# Patient Record
Sex: MALE | Race: ASIAN | NOT HISPANIC OR LATINO | Employment: OTHER | URBAN - METROPOLITAN AREA
[De-identification: names, ages, dates, MRNs, and addresses within clinical notes are randomized per-mention and may not be internally consistent; named-entity substitution may affect disease eponyms.]

---

## 2017-07-03 ENCOUNTER — HOSPITAL ENCOUNTER (OUTPATIENT)
Dept: LAB | Facility: HOSPITAL | Age: 67
Setting detail: SPECIMEN
Discharge: HOME | End: 2017-07-03
Attending: INTERNAL MEDICINE | Admitting: INTERNAL MEDICINE

## 2017-07-03 LAB
ALBUMIN SERPL-MCNC: 4.4 G/DL (ref 3.5–4.8)
ALBUMIN/GLOB SERPL: 1.4 {RATIO} (ref 1–1.7)
ALP SERPL-CCNC: 43 IU/L (ref 32–91)
ALT SERPL-CCNC: 15 IU/L (ref 17–63)
ANION GAP SERPL CALC-SCNC: 15.6 MMOL/L (ref 10–20)
AST SERPL-CCNC: 18 IU/L (ref 15–41)
BILIRUB SERPL-MCNC: 0.5 MG/DL (ref 0.3–1.2)
BUN SERPL-MCNC: 13 MG/DL (ref 8–20)
BUN/CREAT SERPL: 10 (ref 6.2–20.3)
CALCIUM SERPL-MCNC: 9.6 MG/DL (ref 8.9–10.3)
CHLORIDE SERPL-SCNC: 105 MMOL/L (ref 101–111)
CHOLEST SERPL-MCNC: 137 MG/DL
CHOLEST/HDLC SERPL: 3.1 {RATIO}
CONV CO2: 24 MMOL/L (ref 22–32)
CONV LDL CHOLESTEROL DIRECT: 79 MG/DL (ref 0–100)
CONV MICROALBUM.,U,RANDOM: 18 MG/L
CONV TOTAL PROTEIN: 7.5 G/DL (ref 6.1–7.9)
CREAT 24H UR-MCNC: 198.7 MG/DL
CREAT UR-MCNC: 1.3 MG/DL (ref 0.7–1.2)
GLOBULIN UR ELPH-MCNC: 3.1 G/DL (ref 2.5–3.8)
GLUCOSE SERPL-MCNC: 114 MG/DL (ref 65–99)
HDLC SERPL-MCNC: 44 MG/DL
LDLC/HDLC SERPL: 1.8 {RATIO}
LIPID INTERPRETATION: NORMAL
MICROALBUMIN/CREAT UR: 9.1 UG/MG
POTASSIUM SERPL-SCNC: 4.6 MMOL/L (ref 3.6–5.1)
SODIUM SERPL-SCNC: 140 MMOL/L (ref 136–144)
TRIGL SERPL-MCNC: 107 MG/DL
VLDLC SERPL CALC-MCNC: 14.4 MG/DL

## 2017-10-16 ENCOUNTER — HOSPITAL ENCOUNTER (OUTPATIENT)
Dept: LAB | Facility: HOSPITAL | Age: 67
Setting detail: SPECIMEN
Discharge: HOME | End: 2017-10-16
Attending: NURSE PRACTITIONER | Admitting: NURSE PRACTITIONER

## 2017-10-16 LAB
ALBUMIN SERPL-MCNC: 4.4 G/DL (ref 3.5–4.8)
ALBUMIN/GLOB SERPL: 1.6 {RATIO} (ref 1–1.7)
ALP SERPL-CCNC: 44 IU/L (ref 32–91)
ALT SERPL-CCNC: 15 IU/L (ref 17–63)
ANION GAP SERPL CALC-SCNC: 11.3 MMOL/L (ref 10–20)
AST SERPL-CCNC: 17 IU/L (ref 15–41)
BILIRUB SERPL-MCNC: 0.6 MG/DL (ref 0.3–1.2)
BUN SERPL-MCNC: 15 MG/DL (ref 8–20)
BUN/CREAT SERPL: 11.5 (ref 6.2–20.3)
CALCIUM SERPL-MCNC: 9.5 MG/DL (ref 8.9–10.3)
CHLORIDE SERPL-SCNC: 103 MMOL/L (ref 101–111)
CHOLEST SERPL-MCNC: 138 MG/DL
CHOLEST/HDLC SERPL: 3.4 {RATIO}
CONV CO2: 26 MMOL/L (ref 22–32)
CONV LDL CHOLESTEROL DIRECT: 77 MG/DL (ref 0–100)
CONV TOTAL PROTEIN: 7.2 G/DL (ref 6.1–7.9)
CREAT UR-MCNC: 1.3 MG/DL (ref 0.7–1.2)
GLOBULIN UR ELPH-MCNC: 2.8 G/DL (ref 2.5–3.8)
GLUCOSE SERPL-MCNC: 130 MG/DL (ref 65–99)
HDLC SERPL-MCNC: 41 MG/DL
LDLC/HDLC SERPL: 1.9 {RATIO}
LIPID INTERPRETATION: NORMAL
POTASSIUM SERPL-SCNC: 5.3 MMOL/L (ref 3.6–5.1)
SODIUM SERPL-SCNC: 135 MMOL/L (ref 136–144)
TRIGL SERPL-MCNC: 113 MG/DL
VLDLC SERPL CALC-MCNC: 20.2 MG/DL

## 2019-10-07 NOTE — PROGRESS NOTES
[facility] CARDIOLOGY      REASON FOR FOLLOW-UP:  Coronary artery disease  Status post PCI with stenting  Preserved LV function [place] dual antiplatelet therapy            Chief Complaint   Patient presents with   • Diabetes     Followup - pain in neck and difficulty in hearing left ear -left lateral side discomfort    • Hyperlipidemia   • Hypertension         Dear Dr. Powers,    History of Present Illness     It was my pleasure to see Mr. Osorio Tabares in the office today.  As you are aware, he is a very pleasant 69-year-old  gentleman with known history of diabetes mellitus type 2, hypertension, dyslipidemia, B12 deficiency who underwent heart catheterization at [facility] 8/31/2019 where he was found to have severe RCA disease with drug-eluting stent deployment in 3 locations.  There was moderate residual disease in the ostium of the first diagonal branch of the LAD as well as mid circumflex artery.  Normal LV function with EF 60%.  He was started on dual antiplatelet therapy with Brilinta and aspirin.  He has additional past medical history of hypertension, history of tobacco use, and dyslipidemia.  He presents today with his sons in follow-up from that procedure.    The patient does not speak English and conversation is translated through his sons.  The patient does report some pain in his left lateral rib area.  This discomfort is reproducible with palpation, inspiration and movement of his left arm.  He reports some discomfort in his upper neck area that is also reproducible.  He reports occasional mild shortness of breath that may be due to Brilinta.  This was discussed with the sons.  Recommend they monitor the his status for any worsening shortness of breath.  He denies any lower extremity edema, dizziness or lightheadedness.  There is no abnormal bleeding.  His blood pressure is very stable at 111/69.  Rhythm is regular.      Assessment:  Severe  single-vessel coronary artery disease status post PCI   Myocardial infarction  History of diabetes mellitus type 2  Dyslipidemia  Hypertension  Dual antiplatelet therapy      Plan:  Continue current medical plan  Monitor for any worsening shortness of breath  Patient would like to plan a trip back to [place] in the near future.  Family asked for letter to request assistance through the airport.  This was accommodated.  Follow-up in 3 months or sooner if needed.        The following portions of the patient's history were reviewed and updated as appropriate: allergies, current medications, past family history, past medical history, past social history, past surgical history and problem list.    REVIEW OF SYSTEMS:    Review of Systems   Musculoskeletal: Positive for neck pain.        Pain left lateral rib area       Vitals:    10/08/19 1456   BP: 111/69   Pulse: 74   SpO2: 98%         PHYSICAL EXAM:    General: Well-developed, well-nourished 69-year-old  male who isAlert, cooperative, no distress, appears stated age  Head:  Normocephalic, atraumatic, mucous membranes moist  Eyes:  Conjunctiva/corneas clear, EOM's intact     Neck:  Supple,  no JVD or bruit.  Tender to palpate posterior cervical spine area    Lungs: Clear to auscultation bilaterally, no wheezes rhonchi rales are noted  Chest wall: Tenderness to left lateral rib area lower aspect  Musculoskeletal:   Ambulates freely with slow, shuffling gait  Heart::  Regular rate and rhythm, S1 and S2 normal, no murmur, rub or gallop  Abdomen: Soft, non-tender, nondistended bowel sounds active  Extremities: No cyanosis, clubbing, or edema   Pulses: 2+ and symmetric all extremities  Skin:  No rashes or lesions  Neuro/psych: A&O x3. CN II through XII are grossly intact with appropriate affect        Past Medical History:   Diagnosis Date   • Coronary artery disease    • Diabetes mellitus (CMS/[facility])    • Hyperlipidemia    • Hypertension    • Myocardial infarction  (CMS/[facility])        Past Surgical History:   Procedure Laterality Date   • APPENDECTOMY     • CARDIAC CATHETERIZATION     • CORONARY STENT PLACEMENT           Current Outpatient Medications:   •  ACCU-CHEK FASTCLIX LANCETS misc, ACCU-CHEK FASTCLIX LANCETS, Disp: , Rfl:   •  arformoterol (BROVANA) 15 MCG/2ML nebulizer solution, Take  by nebulization 2 (Two) Times a Day., Disp: , Rfl:   •  aspirin (ADULT ASPIRIN EC LOW STRENGTH) 81 MG EC tablet, ADULT ASPIRIN EC LOW STRENGTH 81 MG ORAL TABLET DELAYED RELEASE, Disp: , Rfl:   •  atorvastatin (LIPITOR) 40 MG tablet, , Disp: , Rfl:   •  BRILINTA 90 MG tablet tablet, , Disp: , Rfl:   •  budesonide (PULMICORT) 0.5 MG/2ML nebulizer solution, Take 0.5 mg by nebulization 2 (Two) Times a Day., Disp: , Rfl:   •  Cholecalciferol (VITAMIN D) 2000 units tablet, Daily., Disp: , Rfl:   •  Cyanocobalamin (B-12) 1000 MCG sublingual tablet, B-12 1000 MCG SUBL, Disp: , Rfl:   •  famotidine (PEPCID) 20 MG tablet, , Disp: , Rfl:   •  glimepiride (AMARYL) 1 MG tablet, Every 12 (Twelve) Hours., Disp: , Rfl:   •  glucose blood (ACCU-CHEK GUIDE) test strip, ACCU-CHEK GUIDE STRP, Disp: , Rfl:   •  ipratropium-albuterol (DUO-NEB) 0.5-2.5 mg/3 ml nebulizer, Take 3 mL by nebulization 2 (Two) Times a Day., Disp: , Rfl:   •  metFORMIN (GLUCOPHAGE) 500 MG tablet, , Disp: , Rfl:   •  nitroglycerin (NITROSTAT) 0.4 MG SL tablet, , Disp: , Rfl:   •  SYMBICORT 160-4.5 MCG/ACT inhaler, , Disp: , Rfl:     No Known Allergies    Family History   Problem Relation Age of Onset   • Heart attack Father    • Heart disease Father    • Heart attack Brother    • Heart disease Brother    • Heart disease Paternal Grandmother    • Heart attack Paternal Grandmother        Social History     Tobacco Use   • Smoking status: Former Smoker   • Smokeless tobacco: Never Used   Substance Use Topics   • Alcohol use: No     Frequency: Never           Current Electrocardiogram:    ECG 12 Lead  Date/Time: 10/8/2019 4:26 PM  Performed  by: Lisa Aldana APRN  Authorized by: Lisa Aldana APRN   Previous ECG: no previous ECG available  Rhythm: sinus rhythm  Comments: Old anterior septal wall infarct.  Normal QT and QTc intervals              ORLANDO Elizabeth  10/09/19  9:00 AM

## 2019-10-08 ENCOUNTER — OFFICE VISIT (OUTPATIENT)
Dept: CARDIOLOGY | Facility: CLINIC | Age: 69
End: 2019-10-08

## 2019-10-08 VITALS
WEIGHT: 150.8 LBS | DIASTOLIC BLOOD PRESSURE: 69 MMHG | SYSTOLIC BLOOD PRESSURE: 111 MMHG | BODY MASS INDEX: 23.67 KG/M2 | HEART RATE: 74 BPM | OXYGEN SATURATION: 98 % | HEIGHT: 67 IN

## 2019-10-08 DIAGNOSIS — I25.10 CAD S/P PERCUTANEOUS CORONARY ANGIOPLASTY: Primary | ICD-10-CM

## 2019-10-08 DIAGNOSIS — I10 ESSENTIAL HYPERTENSION: ICD-10-CM

## 2019-10-08 DIAGNOSIS — Z98.61 CAD S/P PERCUTANEOUS CORONARY ANGIOPLASTY: Primary | ICD-10-CM

## 2019-10-08 DIAGNOSIS — I25.10 CORONARY ARTERY DISEASE INVOLVING NATIVE CORONARY ARTERY OF NATIVE HEART WITHOUT ANGINA PECTORIS: ICD-10-CM

## 2019-10-08 DIAGNOSIS — E78.2 MIXED HYPERLIPIDEMIA: ICD-10-CM

## 2019-10-08 PROCEDURE — 99213 OFFICE O/P EST LOW 20 MIN: CPT | Performed by: NURSE PRACTITIONER

## 2019-10-08 PROCEDURE — 93000 ELECTROCARDIOGRAM COMPLETE: CPT | Performed by: NURSE PRACTITIONER

## 2019-10-08 RX ORDER — ATORVASTATIN CALCIUM 40 MG/1
TABLET, FILM COATED ORAL
COMMUNITY
Start: 2019-09-04 | End: 2020-09-02 | Stop reason: SDUPTHER

## 2019-10-08 RX ORDER — MAGNESIUM 200 MG
TABLET ORAL
COMMUNITY
Start: 2017-05-15

## 2019-10-08 RX ORDER — FAMOTIDINE 20 MG/1
TABLET, FILM COATED ORAL
COMMUNITY
Start: 2019-09-04

## 2019-10-08 RX ORDER — CHOLECALCIFEROL (VITAMIN D3) 50 MCG
TABLET ORAL EVERY 24 HOURS
COMMUNITY
Start: 2017-10-23

## 2019-10-08 RX ORDER — BUDESONIDE 0.5 MG/2ML
0.5 INHALANT ORAL 2 TIMES DAILY
COMMUNITY

## 2019-10-08 RX ORDER — IPRATROPIUM BROMIDE AND ALBUTEROL SULFATE 2.5; .5 MG/3ML; MG/3ML
3 SOLUTION RESPIRATORY (INHALATION) 2 TIMES DAILY
COMMUNITY

## 2019-10-08 RX ORDER — GLIMEPIRIDE 1 MG/1
TABLET ORAL EVERY 12 HOURS
COMMUNITY
Start: 2019-04-03

## 2019-10-08 RX ORDER — ARFORMOTEROL TARTRATE 15 UG/2ML
SOLUTION RESPIRATORY (INHALATION)
COMMUNITY

## 2019-10-08 RX ORDER — TICAGRELOR 90 MG/1
TABLET ORAL
COMMUNITY
Start: 2019-09-04 | End: 2020-01-21 | Stop reason: SDUPTHER

## 2019-10-08 RX ORDER — LANCETS
EACH MISCELLANEOUS
COMMUNITY
Start: 2017-05-16

## 2019-10-08 RX ORDER — NITROGLYCERIN 0.4 MG/1
TABLET SUBLINGUAL
COMMUNITY
Start: 2019-09-04

## 2019-10-08 RX ORDER — ASPIRIN 81 MG/1
TABLET ORAL
COMMUNITY
Start: 2017-05-15

## 2019-10-08 RX ORDER — BUDESONIDE AND FORMOTEROL FUMARATE DIHYDRATE 160; 4.5 UG/1; UG/1
AEROSOL RESPIRATORY (INHALATION)
COMMUNITY
Start: 2019-09-06

## 2019-10-09 PROBLEM — E78.2 MIXED HYPERLIPIDEMIA: Status: ACTIVE | Noted: 2019-10-09

## 2019-10-09 PROBLEM — Z98.61 CAD S/P PERCUTANEOUS CORONARY ANGIOPLASTY: Status: ACTIVE | Noted: 2019-10-09

## 2019-10-09 PROBLEM — I25.10 CORONARY ARTERY DISEASE INVOLVING NATIVE CORONARY ARTERY OF NATIVE HEART WITHOUT ANGINA PECTORIS: Status: ACTIVE | Noted: 2019-10-09

## 2019-10-09 PROBLEM — I25.10 CAD S/P PERCUTANEOUS CORONARY ANGIOPLASTY: Status: ACTIVE | Noted: 2019-10-09

## 2020-01-21 ENCOUNTER — OFFICE VISIT (OUTPATIENT)
Dept: CARDIOLOGY | Facility: CLINIC | Age: 70
End: 2020-01-21

## 2020-01-21 VITALS
BODY MASS INDEX: 23.07 KG/M2 | HEIGHT: 67 IN | HEART RATE: 81 BPM | OXYGEN SATURATION: 99 % | SYSTOLIC BLOOD PRESSURE: 129 MMHG | WEIGHT: 147 LBS | DIASTOLIC BLOOD PRESSURE: 85 MMHG

## 2020-01-21 DIAGNOSIS — I25.10 CAD S/P PERCUTANEOUS CORONARY ANGIOPLASTY: Primary | ICD-10-CM

## 2020-01-21 DIAGNOSIS — E11.59 TYPE 2 DIABETES MELLITUS WITH OTHER CIRCULATORY COMPLICATION, WITHOUT LONG-TERM CURRENT USE OF INSULIN (HCC): ICD-10-CM

## 2020-01-21 DIAGNOSIS — Z98.61 CAD S/P PERCUTANEOUS CORONARY ANGIOPLASTY: Primary | ICD-10-CM

## 2020-01-21 DIAGNOSIS — Z79.02 LONG TERM (CURRENT) USE OF ANTITHROMBOTICS/ANTIPLATELETS: ICD-10-CM

## 2020-01-21 DIAGNOSIS — E78.2 MIXED HYPERLIPIDEMIA: ICD-10-CM

## 2020-01-21 PROCEDURE — 99214 OFFICE O/P EST MOD 30 MIN: CPT | Performed by: INTERNAL MEDICINE

## 2020-01-21 RX ORDER — CLOPIDOGREL BISULFATE 75 MG/1
75 TABLET ORAL DAILY
Qty: 90 TABLET | Refills: 3 | Status: SHIPPED | OUTPATIENT
Start: 2020-01-21 | End: 2020-01-21 | Stop reason: SDUPTHER

## 2020-01-21 RX ORDER — CLOPIDOGREL BISULFATE 75 MG/1
75 TABLET ORAL DAILY
Qty: 90 TABLET | Refills: 3 | Status: SHIPPED | OUTPATIENT
Start: 2020-01-21 | End: 2020-07-31 | Stop reason: SDUPTHER

## 2020-01-21 NOTE — PROGRESS NOTES
"Cardiology Office Visit      Encounter Date:  01/21/2020    Patient ID:   Osorio Tabares is a 69 y.o. male.    Reason For Followup:  Coronary artery disease  Hyperlipidemia  Antiplatelet therapy    Brief Clinical History:  Dear Venu Flowers MD    I had the pleasure of seeing Osorio Tabares today. As you are well aware, this is a 69 y.o. male with a history of ischemic heart disease.  He underwent PCI and drug-eluting stent placement in his RCA in August 2019.  He has additional history that includes hyperlipidemia, diabetes mellitus, and long-term antiplatelet therapy.  He presents today for follow-up on the above conditions.    Interval History:  The patient does not speak English.  The conversation occurs through the patient's son who acts as the .  The patient denies any chest pain pressure heaviness or tightness.  He does report some shortness of breath particularly with activities such as climbing the stairs.  He denies any PND orthopnea.  He denies any syncope or near syncope.    We discussed the potential for Brilinta to cause shortness of breath.  We discussed options and have decided to stop Brilinta and start him on clopidogrel.  They were given instructions to overlap the dosing regimens of Brilinta and clopidogrel.    Assessment & Plan    Impressions:  Coronary artery disease status post BEAN x3 RCA 8/2019  Hyperlipidemia  Antiplatelet therapy  Dyspnea possibly related to Brilinta    Recommendations:  Discontinue Brilinta and commence clopidogrel according to instructions given during the visit  Continue aspirin therapy  Continuation of his statin therapy  Follow-up in 4 months time sooner should there be difficulties.    Objective:    Vitals:  Vitals:    01/21/20 1102   BP: 129/85   Pulse: 81   SpO2: 99%   Weight: 66.7 kg (147 lb)   Height: 170.2 cm (67\")       Physical Exam:    General: Alert, cooperative, no distress, appears stated age  Head:  Normocephalic, atraumatic, " mucous membranes moist  Eyes:  Conjunctiva/corneas clear, EOM's intact     Neck:  Supple,  no adenopathy;      Lungs: Clear to auscultation bilaterally, no wheezes rhonchi rales are noted  Chest wall: No tenderness  Heart::  Regular rate and rhythm, S1 and S2 normal, no murmur, rub or gallop  Abdomen: Soft, non-tender, nondistended bowel sounds active  Extremities: No cyanosis, clubbing, or edema  Pulses: 2+ and symmetric all extremities  Skin:  No rashes or lesions  Neuro/psych: A&O x3. CN II through XII are grossly intact with appropriate affect      Allergies:  No Known Allergies    Medication Review:     Current Outpatient Medications:   •  ACCU-CHEK FASTCLIX LANCETS misc, ACCU-CHEK FASTCLIX LANCETS, Disp: , Rfl:   •  arformoterol (BROVANA) 15 MCG/2ML nebulizer solution, Take  by nebulization 2 (Two) Times a Day., Disp: , Rfl:   •  aspirin (ADULT ASPIRIN EC LOW STRENGTH) 81 MG EC tablet, ADULT ASPIRIN EC LOW STRENGTH 81 MG ORAL TABLET DELAYED RELEASE, Disp: , Rfl:   •  atorvastatin (LIPITOR) 40 MG tablet, , Disp: , Rfl:   •  budesonide (PULMICORT) 0.5 MG/2ML nebulizer solution, Take 0.5 mg by nebulization 2 (Two) Times a Day., Disp: , Rfl:   •  Cholecalciferol (VITAMIN D) 2000 units tablet, Daily., Disp: , Rfl:   •  Cyanocobalamin (B-12) 1000 MCG sublingual tablet, B-12 1000 MCG SUBL, Disp: , Rfl:   •  famotidine (PEPCID) 20 MG tablet, , Disp: , Rfl:   •  glimepiride (AMARYL) 1 MG tablet, Every 12 (Twelve) Hours., Disp: , Rfl:   •  glucose blood (ACCU-CHEK GUIDE) test strip, ACCU-CHEK GUIDE STRP, Disp: , Rfl:   •  ipratropium-albuterol (DUO-NEB) 0.5-2.5 mg/3 ml nebulizer, Take 3 mL by nebulization 2 (Two) Times a Day., Disp: , Rfl:   •  metFORMIN (GLUCOPHAGE) 500 MG tablet, , Disp: , Rfl:   •  nitroglycerin (NITROSTAT) 0.4 MG SL tablet, , Disp: , Rfl:   •  SYMBICORT 160-4.5 MCG/ACT inhaler, , Disp: , Rfl:   •  clopidogrel (PLAVIX) 75 MG tablet, Take 1 tablet by mouth Daily., Disp: 90 tablet, Rfl: 3    Family  History:  Family History   Problem Relation Age of Onset   • Heart attack Father    • Heart disease Father    • Heart attack Brother    • Heart disease Brother    • Heart disease Paternal Grandmother    • Heart attack Paternal Grandmother        Past Medical History:  Past Medical History:   Diagnosis Date   • Coronary artery disease    • Diabetes mellitus (CMS/HCC)    • Hyperlipidemia    • Hypertension    • Myocardial infarction (CMS/HCC)        Past surgical History:  Past Surgical History:   Procedure Laterality Date   • APPENDECTOMY     • CARDIAC CATHETERIZATION     • CORONARY STENT PLACEMENT         Social History:  Social History     Socioeconomic History   • Marital status:      Spouse name: Not on file   • Number of children: Not on file   • Years of education: Not on file   • Highest education level: Not on file   Tobacco Use   • Smoking status: Former Smoker   • Smokeless tobacco: Never Used   Substance and Sexual Activity   • Alcohol use: No     Frequency: Never   • Drug use: No       Review of Systems:  The following systems were reviewed as they relate to the cardiovascular system: Constitutional, Eyes, ENT, Cardiovascular, Respiratory, Gastrointestinal, Integumentary, Neurological, Psychiatric, Hematologic, Endocrine, Musculoskeletal, and Genitourinary. The pertinent cardiovascular findings are reported above with all other cardiovascular points within those systems being negative.    Diagnostic Study Review:     Current Electrocardiogram:  Procedures no new EKG noted.  EKG dated 10/8/2019 demonstrates sinus rhythm with a ventricular rate of 74 bpm.  Old anterior septal MI noted.  Normal QT and QTc intervals.  Normal QRS axis.      NOTE: The following portions of the patient's history were reviewed and updated this visit as appropriate: allergies, current medications, past family history, past medical history, past social history, past surgical history and problem list.

## 2020-07-31 RX ORDER — CLOPIDOGREL BISULFATE 75 MG/1
75 TABLET ORAL DAILY
Qty: 90 TABLET | Refills: 3 | Status: SHIPPED | OUTPATIENT
Start: 2020-07-31 | End: 2020-09-02 | Stop reason: SDUPTHER

## 2020-08-26 PROBLEM — E87.5 HYPERKALEMIA: Status: ACTIVE | Noted: 2018-01-29

## 2020-08-26 PROBLEM — I70.211 ATHEROSCLEROSIS OF NATIVE ARTERIES OF EXTREMITIES WITH INTERMITTENT CLAUDICATION, RIGHT LEG (HCC): Status: ACTIVE | Noted: 2017-10-23

## 2020-08-26 PROBLEM — M79.10 MYALGIA: Status: ACTIVE | Noted: 2017-07-11

## 2020-08-26 RX ORDER — LISINOPRIL 5 MG/1
TABLET ORAL EVERY 24 HOURS
COMMUNITY
Start: 2018-01-29

## 2020-08-26 RX ORDER — CILOSTAZOL 100 MG/1
TABLET ORAL EVERY 12 HOURS
COMMUNITY
Start: 2017-11-22

## 2020-09-02 ENCOUNTER — OFFICE VISIT (OUTPATIENT)
Dept: CARDIOLOGY | Facility: CLINIC | Age: 70
End: 2020-09-02

## 2020-09-02 VITALS
HEIGHT: 67 IN | DIASTOLIC BLOOD PRESSURE: 68 MMHG | BODY MASS INDEX: 23.23 KG/M2 | HEART RATE: 71 BPM | WEIGHT: 148 LBS | OXYGEN SATURATION: 98 % | RESPIRATION RATE: 18 BRPM | SYSTOLIC BLOOD PRESSURE: 152 MMHG

## 2020-09-02 DIAGNOSIS — E11.59 TYPE 2 DIABETES MELLITUS WITH OTHER CIRCULATORY COMPLICATION, WITHOUT LONG-TERM CURRENT USE OF INSULIN (HCC): ICD-10-CM

## 2020-09-02 DIAGNOSIS — I10 ESSENTIAL HYPERTENSION: Primary | ICD-10-CM

## 2020-09-02 DIAGNOSIS — Z79.02 LONG TERM (CURRENT) USE OF ANTITHROMBOTICS/ANTIPLATELETS: ICD-10-CM

## 2020-09-02 DIAGNOSIS — I25.10 CORONARY ARTERY DISEASE INVOLVING NATIVE CORONARY ARTERY OF NATIVE HEART WITHOUT ANGINA PECTORIS: ICD-10-CM

## 2020-09-02 DIAGNOSIS — E78.2 MIXED HYPERLIPIDEMIA: ICD-10-CM

## 2020-09-02 DIAGNOSIS — I25.10 CAD S/P PERCUTANEOUS CORONARY ANGIOPLASTY: ICD-10-CM

## 2020-09-02 DIAGNOSIS — Z98.61 CAD S/P PERCUTANEOUS CORONARY ANGIOPLASTY: ICD-10-CM

## 2020-09-02 PROCEDURE — 99214 OFFICE O/P EST MOD 30 MIN: CPT | Performed by: INTERNAL MEDICINE

## 2020-09-02 PROCEDURE — 93000 ELECTROCARDIOGRAM COMPLETE: CPT | Performed by: INTERNAL MEDICINE

## 2020-09-02 RX ORDER — METFORMIN HYDROCHLORIDE 500 MG/1
TABLET, EXTENDED RELEASE ORAL
COMMUNITY
Start: 2020-06-01

## 2020-09-02 RX ORDER — ACETAMINOPHEN 160 MG
TABLET,DISINTEGRATING ORAL
COMMUNITY
Start: 2020-07-29

## 2020-09-02 RX ORDER — ATORVASTATIN CALCIUM 40 MG/1
40 TABLET, FILM COATED ORAL NIGHTLY
Qty: 90 TABLET | Refills: 3 | Status: SHIPPED | OUTPATIENT
Start: 2020-09-02

## 2020-09-02 RX ORDER — CLOPIDOGREL BISULFATE 75 MG/1
75 TABLET ORAL DAILY
Qty: 90 TABLET | Refills: 3 | Status: SHIPPED | OUTPATIENT
Start: 2020-09-02

## 2020-09-02 NOTE — PROGRESS NOTES
Cardiology Office Visit      Encounter Date:  09/02/2020    Patient ID:   Osorio Tabares is a 70 y.o. male.    Reason For Followup:  Coronary artery disease  Hyperlipidemia  Antiplatelet therapy    Brief Clinical History:  Dear Venu Flowers MD    I had the pleasure of seeing Osorio Tabares today. As you are well aware, this is a 70 y.o. male with a history of ischemic heart disease.  He underwent PCI and drug-eluting stent placement in his RCA in August 2019.  He has additional history that includes hyperlipidemia, diabetes mellitus, and long-term antiplatelet therapy.  He presents today for follow-up on the above conditions.    Interval History:  The patient does not speak English.  The conversation occurs through the patient's son who acts as the .  The patient denies any chest pain pressure heaviness or tightness.  He does report some shortness of breath particularly with activities such as climbing the stairs.  This is similar to prior visits.  He denies any PND orthopnea.  He denies any syncope or near syncope.    The patient reports that he walks about 1 to 2 miles a day and will also ride a stationary bike.  He can ride the stationary bike for about 15 minutes without interruption.  He reports that he has some shoulder and neck discomfort that is reproducible on his examination today.  He did report some improvement with the cessation of Brilinta with regards to his shortness of breath but not complete resolution.    His blood pressure is elevated today however it is not regularly checked.  We discussed options.  I will send him a prescription for a blood pressure cuff to see if Medicare will cover this.  We would like some home measurements before making any changes to his pharmacologic therapy.    Assessment & Plan    Impressions:  Coronary artery disease status post BEAN x3 RCA 8/2019  Hyperlipidemia  Antiplatelet therapy  Dyspnea  Diabetes  "mellitus    Recommendations:  Continuation of his current cardiovascular regimen at the present time.  Continue dual antiplatelet therapy for at least another year  Continuation of his statin therapy  Follow-up in 6 months time sooner should there be difficulties.    Objective:    Vitals:  Vitals:    09/02/20 1333   BP: 152/68   BP Location: Left arm   Patient Position: Sitting   Cuff Size: Large Adult   Pulse: 71   Resp: 18   SpO2: 98%   Weight: 67.1 kg (148 lb)   Height: 170.2 cm (67\")       Physical Exam:    General: Alert, cooperative, no distress, appears stated age  Head:  Normocephalic, atraumatic, mucous membranes moist  Eyes:  Conjunctiva/corneas clear, EOM's intact     Neck:  Supple,  no bruit  Lungs: Clear to auscultation bilaterally, no wheezes rhonchi rales are noted  Chest wall: No tenderness  Heart::  Regular rate and rhythm, S1 and S2 normal, no murmur, rub or gallop  Abdomen: Soft, non-tender, nondistended bowel sounds active  Extremities: No cyanosis, clubbing, or edema  Pulses: 2+ and symmetric all extremities  Skin:  No rashes or lesions  Neuro/psych: A&O x3. CN II through XII are grossly intact with appropriate affect      Allergies:  No Known Allergies    Medication Review:     Current Outpatient Medications:   •  ACCU-CHEK FASTCLIX LANCETS mis, ACCU-CHEK FASTCLIX LANCETS, Disp: , Rfl:   •  arformoterol (BROVANA) 15 MCG/2ML nebulizer solution, Take  by nebulization 2 (Two) Times a Day., Disp: , Rfl:   •  aspirin (ADULT ASPIRIN EC LOW STRENGTH) 81 MG EC tablet, ADULT ASPIRIN EC LOW STRENGTH 81 MG ORAL TABLET DELAYED RELEASE, Disp: , Rfl:   •  atorvastatin (LIPITOR) 40 MG tablet, , Disp: , Rfl:   •  budesonide (PULMICORT) 0.5 MG/2ML nebulizer solution, Take 0.5 mg by nebulization 2 (Two) Times a Day., Disp: , Rfl:   •  Cholecalciferol (VITAMIN D) 2000 units tablet, Daily., Disp: , Rfl:   •  Cholecalciferol (VITAMIN D3) 50 MCG (2000 UT) capsule, , Disp: , Rfl:   •  cilostazol (PLETAL) 100 MG " tablet, Every 12 (Twelve) Hours., Disp: , Rfl:   •  clopidogrel (PLAVIX) 75 MG tablet, Take 1 tablet by mouth Daily., Disp: 90 tablet, Rfl: 3  •  Cyanocobalamin (B-12) 1000 MCG sublingual tablet, B-12 1000 MCG SUBL, Disp: , Rfl:   •  famotidine (PEPCID) 20 MG tablet, , Disp: , Rfl:   •  glimepiride (AMARYL) 1 MG tablet, Every 12 (Twelve) Hours., Disp: , Rfl:   •  glucose blood (ACCU-CHEK GUIDE) test strip, ACCU-CHEK GUIDE STRP, Disp: , Rfl:   •  ipratropium-albuterol (DUO-NEB) 0.5-2.5 mg/3 ml nebulizer, Take 3 mL by nebulization 2 (Two) Times a Day., Disp: , Rfl:   •  lisinopril (PRINIVIL,ZESTRIL) 5 MG tablet, Daily., Disp: , Rfl:   •  metFORMIN (GLUCOPHAGE) 500 MG tablet, , Disp: , Rfl:   •  metFORMIN ER (GLUCOPHAGE-XR) 500 MG 24 hr tablet, , Disp: , Rfl:   •  nitroglycerin (NITROSTAT) 0.4 MG SL tablet, , Disp: , Rfl:   •  SYMBICORT 160-4.5 MCG/ACT inhaler, , Disp: , Rfl:     Family History:  Family History   Problem Relation Age of Onset   • Heart attack Father    • Heart disease Father    • Heart attack Brother    • Heart disease Brother    • Heart disease Paternal Grandmother    • Heart attack Paternal Grandmother        Past Medical History:  Past Medical History:   Diagnosis Date   • Coronary artery disease    • Diabetes mellitus (CMS/HCC)    • Hyperlipidemia    • Hypertension    • Myocardial infarction (CMS/HCC)        Past surgical History:  Past Surgical History:   Procedure Laterality Date   • APPENDECTOMY     • CARDIAC CATHETERIZATION     • CORONARY STENT PLACEMENT         Social History:  Social History     Socioeconomic History   • Marital status:      Spouse name: Not on file   • Number of children: Not on file   • Years of education: Not on file   • Highest education level: Not on file   Tobacco Use   • Smoking status: Former Smoker   • Smokeless tobacco: Never Used   Substance and Sexual Activity   • Alcohol use: No     Frequency: Never   • Drug use: No       Review of Systems:  The following  systems were reviewed as they relate to the cardiovascular system: Constitutional, Eyes, ENT, Cardiovascular, Respiratory, Gastrointestinal, Integumentary, Neurological, Psychiatric, Hematologic, Endocrine, Musculoskeletal, and Genitourinary. The pertinent cardiovascular findings are reported above with all other cardiovascular points within those systems being negative.    Diagnostic Study Review:     Current Electrocardiogram:    ECG 12 Lead  Date/Time: 9/2/2020 4:58 PM  Performed by: Brenden Zuniga DO  Authorized by: Brenden Zuniga DO   Comparison: not compared with previous ECG   Previous ECG: no previous ECG available  Comments: Normal sinus rhythm with a ventricular to 75 bpm.  Normal QT and QTc intervals.  Normal QRS axis.              NOTE: The following portions of the patient's history were reviewed and updated this visit as appropriate: allergies, current medications, past family history, past medical history, past social history, past surgical history and problem list.

## 2020-11-23 ENCOUNTER — OFFICE VISIT (OUTPATIENT)
Dept: CARDIOLOGY | Facility: CLINIC | Age: 70
End: 2020-11-23

## 2020-11-23 VITALS
WEIGHT: 160 LBS | SYSTOLIC BLOOD PRESSURE: 129 MMHG | BODY MASS INDEX: 25.06 KG/M2 | HEART RATE: 70 BPM | DIASTOLIC BLOOD PRESSURE: 74 MMHG | OXYGEN SATURATION: 97 %

## 2020-11-23 DIAGNOSIS — Z98.61 CAD S/P PERCUTANEOUS CORONARY ANGIOPLASTY: Primary | ICD-10-CM

## 2020-11-23 DIAGNOSIS — I25.10 CORONARY ARTERY DISEASE INVOLVING NATIVE CORONARY ARTERY OF NATIVE HEART WITHOUT ANGINA PECTORIS: ICD-10-CM

## 2020-11-23 DIAGNOSIS — I10 ESSENTIAL HYPERTENSION: ICD-10-CM

## 2020-11-23 DIAGNOSIS — E78.2 MIXED HYPERLIPIDEMIA: ICD-10-CM

## 2020-11-23 DIAGNOSIS — I25.10 CAD S/P PERCUTANEOUS CORONARY ANGIOPLASTY: Primary | ICD-10-CM

## 2020-11-23 DIAGNOSIS — E11.59 TYPE 2 DIABETES MELLITUS WITH OTHER CIRCULATORY COMPLICATION, WITHOUT LONG-TERM CURRENT USE OF INSULIN (HCC): ICD-10-CM

## 2020-11-23 PROCEDURE — 99213 OFFICE O/P EST LOW 20 MIN: CPT | Performed by: NURSE PRACTITIONER

## 2020-11-23 PROCEDURE — 93000 ELECTROCARDIOGRAM COMPLETE: CPT | Performed by: NURSE PRACTITIONER

## 2020-11-23 RX ORDER — FERROUS SULFATE 325(65) MG
325 TABLET ORAL
COMMUNITY

## 2020-11-23 NOTE — PROGRESS NOTES
[facility] CARDIOLOGY      REASON FOR FOLLOW-UP:  Acute visit for right shoulder pain          Chief Complaint   Patient presents with   • Coronary Artery Disease     pt complains of chest pain and arm pain    • Hypertension   • Hyperlipidemia         Dear Dr. Powers,    History of Present Illness     It was my pleasure to see Mr. Tabares in acute office visit today.  As you are aware, he is a 70 y.o. male with a history of ischemic heart disease.  He underwent PCI and drug-eluting stent placement in his RCA in August 2019. He has additional history that includes hyperlipidemia, diabetes mellitus, and long-term antiplatelet therapy.  He presents today in acute office visit for right shoulder pain.  He is accompanied by his son today who is assist with translation as he speaks very little English.  Son reports patient has complained of pain and is right shoulder, upper chest/right arm area.  Patient denies any associated symptoms.  Patient was actually seen in October 2019 for similar symptoms.  The son stated the patient sits on the sofa leaning to the right on arm of the sofa with his head tilted to the right rather consistently.  Son states that the patient then has discomfort in that arm and shoulder.  His symptoms are exacerbated with movement only.  Son is also requesting another prescription for home blood pressure monitoring device-as the last one was denied.    Assessment:  Right shoulder/arm pain  History of coronary artery disease  History of percutaneous coronary intervention to the RCA  Dyslipidemia  Diabetes mellitus 2  Antiplatelet therapy        Plan:  Patient and son were reassured-symptoms appear to be more musculoskeletal in origin  Rx given for home blood pressure monitoring device  Continue current therapy  Follow-up with Dr. Zuniga as scheduled        The following portions of the patient's history were reviewed and updated as appropriate: allergies, current  medications, past family history, past medical history, past social history, past surgical history and problem list.    REVIEW OF SYSTEMS:    ANGEL    Vitals:    11/23/20 1434   BP: 129/74   Pulse: 70   SpO2: 97%         PHYSICAL EXAM:    General: Alert, cooperative, no distress, appears stated age  Head:  Normocephalic, atraumatic, mucous membranes moist  Eyes:  Conjunctiva/corneas clear, EOM's intact     Neck:  Supple,  no JVD or bruit     Lungs: Clear to auscultation bilaterally, no wheezes rhonchi rales are noted  Chest wall: No tenderness  Musculoskeletal:   Ambulates freely without assistance  Heart::  Regular rate and rhythm, S1 and S2 normal, no murmur, rub or gallop  Abdomen: Soft, non-tender, nondistended, bowel sounds active, no abdominal bruit  Extremities: No cyanosis, clubbing, or edema   Pulses: 2+ and symmetric all extremities  Skin:  No rashes or lesions  Neuro/psych: A&O x3. CN II through XII are grossly intact with appropriate affect        Past Medical History:   Diagnosis Date   • Coronary artery disease    • Diabetes mellitus (CMS/HCC)    • Hyperlipidemia    • Hypertension    • Myocardial infarction (CMS/HCC)        Past Surgical History:   Procedure Laterality Date   • APPENDECTOMY     • CARDIAC CATHETERIZATION     • CORONARY STENT PLACEMENT           Current Outpatient Medications:   •  ACCU-CHEK FASTCLIX LANCETS mis, ACCU-CHEK FASTCLIX LANCETS, Disp: , Rfl:   •  aspirin (ADULT ASPIRIN EC LOW STRENGTH) 81 MG EC tablet, ADULT ASPIRIN EC LOW STRENGTH 81 MG ORAL TABLET DELAYED RELEASE, Disp: , Rfl:   •  atorvastatin (LIPITOR) 40 MG tablet, Take 1 tablet by mouth Every Night., Disp: 90 tablet, Rfl: 3  •  Cholecalciferol (VITAMIN D3) 50 MCG (2000 UT) capsule, , Disp: , Rfl:   •  clopidogrel (PLAVIX) 75 MG tablet, Take 1 tablet by mouth Daily., Disp: 90 tablet, Rfl: 3  •  ferrous sulfate 325 (65 FE) MG tablet, Take 325 mg by mouth Daily With Breakfast., Disp: , Rfl:   •  glimepiride (AMARYL) 1 MG  "tablet, Every 12 (Twelve) Hours., Disp: , Rfl:   •  glucose blood (ACCU-CHEK GUIDE) test strip, ACCU-CHEK GUIDE STRP, Disp: , Rfl:   •  metFORMIN (GLUCOPHAGE) 500 MG tablet, , Disp: , Rfl:   •  nitroglycerin (NITROSTAT) 0.4 MG SL tablet, , Disp: , Rfl:   •  arformoterol (BROVANA) 15 MCG/2ML nebulizer solution, Take  by nebulization 2 (Two) Times a Day., Disp: , Rfl:   •  budesonide (PULMICORT) 0.5 MG/2ML nebulizer solution, Take 0.5 mg by nebulization 2 (Two) Times a Day., Disp: , Rfl:   •  Cholecalciferol (VITAMIN D) 2000 units tablet, Daily., Disp: , Rfl:   •  cilostazol (PLETAL) 100 MG tablet, Every 12 (Twelve) Hours., Disp: , Rfl:   •  Cyanocobalamin (B-12) 1000 MCG sublingual tablet, B-12 1000 MCG SUBL, Disp: , Rfl:   •  famotidine (PEPCID) 20 MG tablet, , Disp: , Rfl:   •  ipratropium-albuterol (DUO-NEB) 0.5-2.5 mg/3 ml nebulizer, Take 3 mL by nebulization 2 (Two) Times a Day., Disp: , Rfl:   •  lisinopril (PRINIVIL,ZESTRIL) 5 MG tablet, Daily., Disp: , Rfl:   •  metFORMIN ER (GLUCOPHAGE-XR) 500 MG 24 hr tablet, , Disp: , Rfl:   •  SYMBICORT 160-4.5 MCG/ACT inhaler, , Disp: , Rfl:     No Known Allergies    Family History   Problem Relation Age of Onset   • Heart attack Father    • Heart disease Father    • Heart attack Brother    • Heart disease Brother    • Heart disease Paternal Grandmother    • Heart attack Paternal Grandmother        Social History     Tobacco Use   • Smoking status: Former Smoker   • Smokeless tobacco: Never Used   Substance Use Topics   • Alcohol use: No     Frequency: Never           Current Electrocardiogram:    ECG 12 Lead    Date/Time: 11/23/2020 1:07 PM  Performed by: Lisa Aldana APRN  Authorized by: Lisa Aldana APRN   Comparison: not compared with previous ECG   Rhythm: sinus rhythm  BPM: 70  Q waves: V1                    EMR Dragon/Transcription:   \"Dictated utilizing Dragon dictation\".         "

## 2020-11-24 PROBLEM — M79.601 PAIN OF RIGHT UPPER EXTREMITY: Status: ACTIVE | Noted: 2020-11-24

## 2021-04-13 ENCOUNTER — OFFICE VISIT (OUTPATIENT)
Dept: CARDIOLOGY | Facility: CLINIC | Age: 71
End: 2021-04-13

## 2021-04-13 VITALS
BODY MASS INDEX: 24.43 KG/M2 | WEIGHT: 156 LBS | OXYGEN SATURATION: 97 % | HEART RATE: 80 BPM | DIASTOLIC BLOOD PRESSURE: 70 MMHG | SYSTOLIC BLOOD PRESSURE: 127 MMHG

## 2021-04-13 DIAGNOSIS — E78.2 MIXED HYPERLIPIDEMIA: ICD-10-CM

## 2021-04-13 DIAGNOSIS — E11.59 TYPE 2 DIABETES MELLITUS WITH OTHER CIRCULATORY COMPLICATION, WITHOUT LONG-TERM CURRENT USE OF INSULIN (HCC): ICD-10-CM

## 2021-04-13 DIAGNOSIS — I10 ESSENTIAL HYPERTENSION: ICD-10-CM

## 2021-04-13 DIAGNOSIS — Z79.02 LONG TERM (CURRENT) USE OF ANTITHROMBOTICS/ANTIPLATELETS: ICD-10-CM

## 2021-04-13 DIAGNOSIS — I25.10 CAD S/P PERCUTANEOUS CORONARY ANGIOPLASTY: Primary | ICD-10-CM

## 2021-04-13 DIAGNOSIS — I25.10 CORONARY ARTERY DISEASE INVOLVING NATIVE CORONARY ARTERY OF NATIVE HEART WITHOUT ANGINA PECTORIS: ICD-10-CM

## 2021-04-13 DIAGNOSIS — Z98.61 CAD S/P PERCUTANEOUS CORONARY ANGIOPLASTY: Primary | ICD-10-CM

## 2021-04-13 PROCEDURE — 99213 OFFICE O/P EST LOW 20 MIN: CPT | Performed by: NURSE PRACTITIONER

## 2021-04-13 NOTE — PROGRESS NOTES
[facility] CARDIOLOGY      REASON FOR FOLLOW-UP:  Follow-up hospitalization for chest pain          Chief Complaint   Patient presents with   • Coronary Artery Disease     2 week f/u CM    • Hypertension   • Hyperlipidemia         Dear Venu Powers MD        History of Present Illness     It was my pleasure to see Mr. Tabares in acute office visit today.  As you are aware, he is a 70 y.o. male with a history of ischemic heart disease.  He underwent PCI and drug-eluting stent placement in his RCA in August 2019. He has additional history that includes hyperlipidemia, diabetes mellitus, and long-term antiplatelet therapy.  He presents today in acute office visit for right shoulder pain.  He is accompanied by his son today who is assist with translation as he speaks very little English.    Patient was admitted to [facility] 3/29/2021 for symptoms of chest discomfort.  He ruled out for acute coronary syndrome and underwent myocardial perfusion study which demonstrated no evidence of ischemia, normal wall motion function and EF 65%.  Normal study.  D-dimer normal.  Patient was treated with duo nebs for shortness of breath that he reported as well.  His status stabilized and improved, he was discharged home and presents today with his son for follow-up.    Today, patient reports no further chest discomfort.  His shortness of breath has improved as well.  I reviewed results of stress testing with the patient and son today.  Questions were answered.  Patient still reports some discomfort across his shoulders posteriorly with movement that he has reported in the past.  I reassured them that this is likely musculoskeletal.        Assessment:  History of coronary artery disease  History of percutaneous coronary intervention to the RCA  Dyslipidemia  Diabetes mellitus 2  Antiplatelet therapy    Plan:  Continue current medical plan of care  Follow-up in 3 months or sooner if  needed        The following portions of the patient's history were reviewed and updated as appropriate: allergies, current medications, past family history, past medical history, past social history, past surgical history and problem list.    REVIEW OF SYSTEMS:    Review of Systems   Musculoskeletal:        Discomfort across upper back and shoulders with movement   All other systems reviewed and are negative.      Vitals:    04/13/21 1408   BP: 127/70   Pulse: 80   SpO2: 97%         PHYSICAL EXAM:    General: Well-developed, well-nourished 70-year-old male who is alert, cooperative, no distress, appears stated age  Head:  Normocephalic, atraumatic, mucous membranes moist  Eyes:  Conjunctiva/corneas clear, EOM's intact     Neck:  Supple,  no JVD or bruit     Lungs: Clear to auscultation bilaterally, no wheezes rhonchi rales are noted  Chest wall: No tenderness  Musculoskeletal:   Ambulates slowly without assistance  Heart::  Regular rate and rhythm, S1 and S2 normal, no murmur, rub or gallop  Abdomen: Soft, non-tender, nondistended, bowel sounds active, no abdominal bruit  Extremities: No cyanosis, clubbing, or edema   Pulses: 2+ and symmetric all extremities  Skin:  No rashes or lesions  Neuro/psych: A&O x3. CN II through XII are grossly intact with appropriate affect        Past Medical History:   Diagnosis Date   • Coronary artery disease    • Diabetes mellitus (CMS/HCC)    • Hyperlipidemia    • Hypertension    • Myocardial infarction (CMS/HCC)        Past Surgical History:   Procedure Laterality Date   • APPENDECTOMY     • CARDIAC CATHETERIZATION     • CORONARY STENT PLACEMENT           Current Outpatient Medications:   •  aspirin (ADULT ASPIRIN EC LOW STRENGTH) 81 MG EC tablet, ADULT ASPIRIN EC LOW STRENGTH 81 MG ORAL TABLET DELAYED RELEASE, Disp: , Rfl:   •  atorvastatin (LIPITOR) 40 MG tablet, Take 1 tablet by mouth Every Night., Disp: 90 tablet, Rfl: 3  •  Cholecalciferol (VITAMIN D3) 50 MCG (2000 UT)  "capsule, , Disp: , Rfl:   •  clopidogrel (PLAVIX) 75 MG tablet, Take 1 tablet by mouth Daily., Disp: 90 tablet, Rfl: 3  •  ferrous sulfate 325 (65 FE) MG tablet, Take 325 mg by mouth Daily With Breakfast., Disp: , Rfl:   •  glimepiride (AMARYL) 1 MG tablet, Every 12 (Twelve) Hours., Disp: , Rfl:   •  metFORMIN (GLUCOPHAGE) 500 MG tablet, , Disp: , Rfl:   •  nitroglycerin (NITROSTAT) 0.4 MG SL tablet, , Disp: , Rfl:   •  ACCU-CHEK FASTCLIX LANCETS misc, ACCU-CHEK FASTCLIX LANCETS, Disp: , Rfl:   •  arformoterol (BROVANA) 15 MCG/2ML nebulizer solution, Take  by nebulization 2 (Two) Times a Day., Disp: , Rfl:   •  budesonide (PULMICORT) 0.5 MG/2ML nebulizer solution, Take 0.5 mg by nebulization 2 (Two) Times a Day., Disp: , Rfl:   •  Cholecalciferol (VITAMIN D) 2000 units tablet, Daily., Disp: , Rfl:   •  cilostazol (PLETAL) 100 MG tablet, Every 12 (Twelve) Hours., Disp: , Rfl:   •  Cyanocobalamin (B-12) 1000 MCG sublingual tablet, B-12 1000 MCG SUBL, Disp: , Rfl:   •  famotidine (PEPCID) 20 MG tablet, , Disp: , Rfl:   •  glucose blood (ACCU-CHEK GUIDE) test strip, ACCU-CHEK GUIDE STRP, Disp: , Rfl:   •  ipratropium-albuterol (DUO-NEB) 0.5-2.5 mg/3 ml nebulizer, Take 3 mL by nebulization 2 (Two) Times a Day., Disp: , Rfl:   •  lisinopril (PRINIVIL,ZESTRIL) 5 MG tablet, Daily., Disp: , Rfl:   •  metFORMIN ER (GLUCOPHAGE-XR) 500 MG 24 hr tablet, , Disp: , Rfl:   •  SYMBICORT 160-4.5 MCG/ACT inhaler, , Disp: , Rfl:     No Known Allergies    Family History   Problem Relation Age of Onset   • Heart attack Father    • Heart disease Father    • Heart attack Brother    • Heart disease Brother    • Heart disease Paternal Grandmother    • Heart attack Paternal Grandmother        Social History     Tobacco Use   • Smoking status: Former Smoker   • Smokeless tobacco: Never Used   Substance Use Topics   • Alcohol use: No           Current Electrocardiogram:  Procedures        EMR Dragon/Transcription:   \"Dictated utilizing Dragon " "dictation\".       "

## 2024-04-26 ENCOUNTER — OFFICE VISIT (OUTPATIENT)
Dept: CARDIOLOGY | Facility: CLINIC | Age: 74
End: 2024-04-26
Payer: MEDICARE

## 2024-04-26 VITALS
DIASTOLIC BLOOD PRESSURE: 80 MMHG | SYSTOLIC BLOOD PRESSURE: 132 MMHG | OXYGEN SATURATION: 99 % | BODY MASS INDEX: 24.33 KG/M2 | HEIGHT: 67 IN | HEART RATE: 70 BPM | WEIGHT: 155 LBS

## 2024-04-26 DIAGNOSIS — R07.89 CHEST PAIN, ATYPICAL: ICD-10-CM

## 2024-04-26 DIAGNOSIS — Z98.61 CAD S/P PERCUTANEOUS CORONARY ANGIOPLASTY: Primary | ICD-10-CM

## 2024-04-26 DIAGNOSIS — I25.10 CAD S/P PERCUTANEOUS CORONARY ANGIOPLASTY: Primary | ICD-10-CM

## 2024-04-26 DIAGNOSIS — E78.2 MIXED HYPERLIPIDEMIA: ICD-10-CM

## 2024-04-26 RX ORDER — ASPIRIN 81 MG/1
81 TABLET ORAL DAILY
Qty: 90 TABLET | Refills: 2 | Status: SHIPPED | OUTPATIENT
Start: 2024-04-26

## 2024-04-26 RX ORDER — TAMSULOSIN HYDROCHLORIDE 0.4 MG/1
1 CAPSULE ORAL
COMMUNITY
Start: 2024-03-11

## 2024-04-26 NOTE — PROGRESS NOTES
[facility] CARDIOLOGY      REASON FOR FOLLOW-UP:  Coronary artery disease  History of percutaneous coronary intervention-RCA          Chief Complaint   Patient presents with    Heart Problem         Dear Venu Powers MD        History of Present Illness   It was my pleasure to see Osorio Tabares in the office today.  He is a 73 y.o. male with a known history of ischemic heart disease.  He underwent PCI and drug-eluting stent placement in his RCA in August 2019. He has additional history that includes hyperlipidemia, diabetes mellitus, and long-term antiplatelet therapy.   Patient was admitted to [facility] 3/29/2021 for symptoms of chest discomfort. He ruled out for acute coronary syndrome and underwent myocardial perfusion study which demonstrated no evidence of ischemia, normal wall motion function and EF 65%. Normal study.  The patient's last office visit was 4/13/2021.  He presents today with his son for reevaluation on the above diagnoses.    The patient speaks very little English, understands some.  His son is present today to help with translation.  The patient does report some discomfort in his left lateral chest and area over his left scapula/trapezius.  He denies any injury, no shortness of breath, lower extremity edema, dizziness or lightheadedness.  This area is tender to palpate.  He has not had an echo for several years.      ASSESSMENT:  History of coronary artery disease  History of percutaneous coronary intervention to the RCA  Dyslipidemia  Diabetes mellitus 2  Antiplatelet therapy    PLAN:  I will check 2D echocardiogram to reevaluate LV and valvular function.  His discomfort reported today appears to be musculoskeletal.  His blood pressure is well-controlled at 132/80, EKG shows sinus rhythm with old septal wall injury.  Recommend continuing current CV plan of care to include aspirin-refill sent, statin, Plavix.  Surveillance labs per primary care.   Follow-up with cardiologist in 4 weeks      Diagnoses and all orders for this visit:    1. CAD S/P percutaneous coronary angioplasty (Primary)  -     Adult Transthoracic Echo Complete w/ Color, Spectral and Contrast if necessary per protocol; Future    2. Mixed hyperlipidemia  -     Adult Transthoracic Echo Complete w/ Color, Spectral and Contrast if necessary per protocol; Future    3. Chest pain, atypical  -     Adult Transthoracic Echo Complete w/ Color, Spectral and Contrast if necessary per protocol; Future    Other orders  -     aspirin 81 MG EC tablet; Take 1 tablet by mouth Daily.  Dispense: 90 tablet; Refill: 2          The following portions of the patient's history were reviewed and updated as appropriate: allergies, current medications, past family history, past medical history, past social history, past surgical history, and problem list.    REVIEW OF SYSTEMS:    Review of Systems   Musculoskeletal:  Positive for back pain.        Pain in posterior thoracic rib area   All other systems reviewed and are negative.      Vitals:    04/26/24 1107   BP: 132/80   Pulse: 70   SpO2: 99%         PHYSICAL EXAM:    General: Alert, cooperative, no distress, appears stated age  Head:  Normocephalic, atraumatic, mucous membranes moist  Eyes:  Conjunctiva/corneas clear, EOM's intact     Neck:  Supple,  no JVD or bruit     Lungs: Clear to auscultation bilaterally, no wheezes rhonchi rales are noted  Chest wall: No tenderness  Musculoskeletal:   Ambulates with assistance of a cane  Heart::  Regular rate and rhythm, S1 and S2 normal, no murmur, rub or gallop  Abdomen: Soft, non-tender, nondistended, bowel sounds active, no abdominal bruit  Extremities: No cyanosis, clubbing, or edema   Pulses: 2+ and symmetric all extremities  Skin:  No rashes or lesions  Neuro/psych: A&O x3. CN II through XII are grossly intact with appropriate affect        Past Medical History:   Diagnosis Date    Coronary artery disease     Diabetes  mellitus     Hyperlipidemia     Hypertension     Myocardial infarction        Past Surgical History:   Procedure Laterality Date    APPENDECTOMY      CARDIAC CATHETERIZATION      CORONARY STENT PLACEMENT           Current Outpatient Medications:     ACCU-CHEK FASTCLIX LANCETS misc, ACCU-CHEK FASTCLIX LANCETS, Disp: , Rfl:     arformoterol (BROVANA) 15 MCG/2ML nebulizer solution, Take  by nebulization 2 (Two) Times a Day., Disp: , Rfl:     aspirin 81 MG EC tablet, Take 1 tablet by mouth Daily., Disp: 90 tablet, Rfl: 2    atorvastatin (LIPITOR) 40 MG tablet, Take 1 tablet by mouth Every Night., Disp: 90 tablet, Rfl: 3    budesonide (PULMICORT) 0.5 MG/2ML nebulizer solution, Take 2 mL by nebulization 2 (Two) Times a Day., Disp: , Rfl:     Cholecalciferol (VITAMIN D) 2000 units tablet, Daily., Disp: , Rfl:     Cholecalciferol (VITAMIN D3) 50 MCG (2000 UT) capsule, , Disp: , Rfl:     clopidogrel (PLAVIX) 75 MG tablet, Take 1 tablet by mouth Daily., Disp: 90 tablet, Rfl: 3    Cyanocobalamin (B-12) 1000 MCG sublingual tablet, B-12 1000 MCG SUBL, Disp: , Rfl:     famotidine (PEPCID) 20 MG tablet, , Disp: , Rfl:     ferrous sulfate 325 (65 FE) MG tablet, Take 1 tablet by mouth Daily With Breakfast., Disp: , Rfl:     glimepiride (AMARYL) 1 MG tablet, Every 12 (Twelve) Hours., Disp: , Rfl:     glucose blood (ACCU-CHEK GUIDE) test strip, ACCU-CHEK GUIDE STRP, Disp: , Rfl:     ipratropium-albuterol (DUO-NEB) 0.5-2.5 mg/3 ml nebulizer, Take 3 mL by nebulization 2 (Two) Times a Day., Disp: , Rfl:     metFORMIN (GLUCOPHAGE) 500 MG tablet, , Disp: , Rfl:     nitroglycerin (NITROSTAT) 0.4 MG SL tablet, , Disp: , Rfl:     SYMBICORT 160-4.5 MCG/ACT inhaler, , Disp: , Rfl:     tamsulosin (FLOMAX) 0.4 MG capsule 24 hr capsule, Take 1 capsule by mouth every night at bedtime., Disp: , Rfl:     No Known Allergies    Family History   Problem Relation Age of Onset    Heart attack Father     Heart disease Father     Heart attack Brother      "Heart disease Brother     Heart disease Paternal Grandmother     Heart attack Paternal Grandmother        Social History     Tobacco Use    Smoking status: Former    Smokeless tobacco: Never   Substance Use Topics    Alcohol use: No           Current Electrocardiogram:    ECG 12 Lead    Date/Time: 4/26/2024 1:03 PM  Performed by: Lisa Aldana APRN    Authorized by: Lisa Aldana APRN  Comparison: not compared with previous ECG   Previous ECG: no previous ECG available  Rhythm: sinus rhythm  BPM: 70  Q waves: V1 and V2                EMR Dragon/Transcription:   \"Dictated utilizing Dragon dictation\".     Copied text in this note has been reviewed by me and is accurate as of 04/29/24.    "

## 2024-05-20 ENCOUNTER — HOSPITAL ENCOUNTER (OUTPATIENT)
Dept: CARDIOLOGY | Facility: HOSPITAL | Age: 74
Discharge: HOME | End: 2024-05-20
Admitting: NURSE PRACTITIONER
Payer: MEDICARE

## 2024-05-20 VITALS
SYSTOLIC BLOOD PRESSURE: 108 MMHG | HEIGHT: 67 IN | WEIGHT: 155 LBS | BODY MASS INDEX: 24.33 KG/M2 | DIASTOLIC BLOOD PRESSURE: 52 MMHG

## 2024-05-20 DIAGNOSIS — E78.2 MIXED HYPERLIPIDEMIA: ICD-10-CM

## 2024-05-20 DIAGNOSIS — I25.10 CAD S/P PERCUTANEOUS CORONARY ANGIOPLASTY: ICD-10-CM

## 2024-05-20 DIAGNOSIS — Z98.61 CAD S/P PERCUTANEOUS CORONARY ANGIOPLASTY: ICD-10-CM

## 2024-05-20 DIAGNOSIS — R07.89 CHEST PAIN, ATYPICAL: ICD-10-CM

## 2024-05-20 LAB
BH CV ECHO MEAS - AO MAX PG: 9.3 MMHG
BH CV ECHO MEAS - AO MEAN PG: 5.5 MMHG
BH CV ECHO MEAS - AO ROOT DIAM: 3.6 CM
BH CV ECHO MEAS - AO V2 MAX: 152.1 CM/SEC
BH CV ECHO MEAS - AO V2 VTI: 31.6 CM
BH CV ECHO MEAS - AVA(I,D): 1.58 CM2
BH CV ECHO MEAS - EDV(CUBED): 64.3 ML
BH CV ECHO MEAS - EDV(MOD-SP2): 89.8 ML
BH CV ECHO MEAS - EDV(MOD-SP4): 83.6 ML
BH CV ECHO MEAS - EF(MOD-BP): 56 %
BH CV ECHO MEAS - EF(MOD-SP2): 52.8 %
BH CV ECHO MEAS - EF(MOD-SP4): 59.2 %
BH CV ECHO MEAS - ESV(CUBED): 21.3 ML
BH CV ECHO MEAS - ESV(MOD-SP2): 42.4 ML
BH CV ECHO MEAS - ESV(MOD-SP4): 34.1 ML
BH CV ECHO MEAS - FS: 30.8 %
BH CV ECHO MEAS - IVS/LVPW: 0.99 CM
BH CV ECHO MEAS - IVSD: 0.86 CM
BH CV ECHO MEAS - LA DIMENSION: 4.1 CM
BH CV ECHO MEAS - LV DIASTOLIC VOL/BSA (35-75): 46.1 CM2
BH CV ECHO MEAS - LV MASS(C)D: 104.7 GRAMS
BH CV ECHO MEAS - LV MAX PG: 3.1 MMHG
BH CV ECHO MEAS - LV MEAN PG: 1.85 MMHG
BH CV ECHO MEAS - LV SYSTOLIC VOL/BSA (12-30): 18.8 CM2
BH CV ECHO MEAS - LV V1 MAX: 88.3 CM/SEC
BH CV ECHO MEAS - LV V1 VTI: 18 CM
BH CV ECHO MEAS - LVIDD: 4 CM
BH CV ECHO MEAS - LVIDS: 2.8 CM
BH CV ECHO MEAS - LVOT AREA: 2.8 CM2
BH CV ECHO MEAS - LVOT DIAM: 1.88 CM
BH CV ECHO MEAS - LVPWD: 0.87 CM
BH CV ECHO MEAS - MV A MAX VEL: 100.2 CM/SEC
BH CV ECHO MEAS - MV DEC SLOPE: 263.5 CM/SEC2
BH CV ECHO MEAS - MV DEC TIME: 0.26 SEC
BH CV ECHO MEAS - MV E MAX VEL: 69.8 CM/SEC
BH CV ECHO MEAS - MV E/A: 0.7
BH CV ECHO MEAS - MV MAX PG: 3.9 MMHG
BH CV ECHO MEAS - MV MEAN PG: 1.62 MMHG
BH CV ECHO MEAS - MV V2 VTI: 28 CM
BH CV ECHO MEAS - MVA(VTI): 1.78 CM2
BH CV ECHO MEAS - PA V2 MAX: 110.5 CM/SEC
BH CV ECHO MEAS - PI END-D VEL: 119.5 CM/SEC
BH CV ECHO MEAS - RAP SYSTOLE: 3 MMHG
BH CV ECHO MEAS - RV MAX PG: 2.6 MMHG
BH CV ECHO MEAS - RV V1 MAX: 81 CM/SEC
BH CV ECHO MEAS - RV V1 VTI: 15.1 CM
BH CV ECHO MEAS - RVDD: 3.1 CM
BH CV ECHO MEAS - RVSP: 22.1 MMHG
BH CV ECHO MEAS - SV(LVOT): 49.9 ML
BH CV ECHO MEAS - SV(MOD-SP2): 47.4 ML
BH CV ECHO MEAS - SV(MOD-SP4): 49.5 ML
BH CV ECHO MEAS - SVI(LVOT): 27.5 ML/M2
BH CV ECHO MEAS - SVI(MOD-SP2): 26.1 ML/M2
BH CV ECHO MEAS - SVI(MOD-SP4): 27.3 ML/M2
BH CV ECHO MEAS - TR MAX PG: 19.1 MMHG
BH CV ECHO MEAS - TR MAX VEL: 218.4 CM/SEC

## 2024-05-20 PROCEDURE — 93306 TTE W/DOPPLER COMPLETE: CPT

## 2024-05-20 PROCEDURE — 93306 TTE W/DOPPLER COMPLETE: CPT | Performed by: INTERNAL MEDICINE
